# Patient Record
Sex: FEMALE | Race: BLACK OR AFRICAN AMERICAN | NOT HISPANIC OR LATINO
[De-identification: names, ages, dates, MRNs, and addresses within clinical notes are randomized per-mention and may not be internally consistent; named-entity substitution may affect disease eponyms.]

---

## 2019-05-07 PROBLEM — Z00.00 ENCOUNTER FOR PREVENTIVE HEALTH EXAMINATION: Status: ACTIVE | Noted: 2019-05-07

## 2019-05-14 ENCOUNTER — APPOINTMENT (OUTPATIENT)
Dept: ORTHOPEDIC SURGERY | Facility: CLINIC | Age: 59
End: 2019-05-14
Payer: COMMERCIAL

## 2019-05-14 PROCEDURE — 99213 OFFICE O/P EST LOW 20 MIN: CPT

## 2019-05-14 PROCEDURE — 73562 X-RAY EXAM OF KNEE 3: CPT | Mod: LT

## 2019-05-14 NOTE — PHYSICAL EXAM
[de-identified] : X-ray left knee. There is moderate medial compartment arthritis mild lateral and severe patellofemoral arthritis [de-identified] : Constitutional: General Appearance: healthy-appearing, NAD, and normal body habitus.  \par \par \par Gait and Station: Appearance: normal gait, no limp, and ambulates with no assitive devices.  \par \par \par Knees: Inspection Right: no deformity, mass, induration, erythema, warmth, or swelling/effusion. Inspection Left: no deformity, mass, induration, erythema, warmth, or swelling/effusion. Bony Palpation Right: no tenderness of the inferior pole patella, the superior pole patella, the tibial tubercle, the medial femoral condyle, the adductor tubercle, the medial joint line, the lateral joint line, the medial tibial plateau, the lateral tibial plateau, the lateral femoral condyle, Gerdy's tubercle, the head of fibula, or the neck of fibula and tenderness of the lateral patellar facet and the medial patellar facet. Bony Palpation Left: no tenderness of the lateral patellar facet, the inferior pole patella, the superior pole patella, the tibial tubercle, the medial femoral condyle, the adductor tubercle, the medial joint line, the lateral joint line, the medial tibial plateau, the lateral tibial plateau, the lateral femoral condyle, Gerdy's tubercle, the head of fibula, or the neck of fibula and tenderness of the medial patellar facet. Soft Tissue Palpation Right: no tenderness of the quadriceps tendon, the patellar tendon, the lateral collateral ligament, the prepatellar bursa, the medial collateral ligament, the pes anserinus, the iliotibial tract, the popliteal fossa, the biceps femoris tendon, or the gastrocnemius and tenderness of the lateral patellar retinaculum and the medial patellar retinaculum. Soft Tissue Palpation Left: no tenderness of the quadriceps tendon, the patellar tendon, the lateral patellar retinaculum, the prepatellar bursa, the medial collateral ligament, the lateral collateral ligament, the pes anserinus, the iliotibial tract, the popliteal fossa, the biceps femoris tendon, or the gastrocnemius and tenderness of the medial patellar retinaculum. Active Range of Motion Right: crepitus and flexion (125 deg) and extension normal and no pain with motion. Active Range of Motion Left: crepitus and flexion (125 deg.) and extension normal and no pain with motion. Stability Right: no varus or vagus instability or patellar laxity and anterior drawer sign negative, posterior drawer sign negative, and Lachman test negative. Stability Left: no varus or valgus instability or patellar laxity and anterior drawer sign negative, posterior drawer sign negative, and Lachman test negative. Special Tests Right: Apley's compression test negative and Vanessa's displacement test negative. Special Tests Left: Apley's compression test negative and Vanessa's displacement test negative. Strength Right: flexion 5/5 and extension 5/5. Strength Left: flexion 5/5 and extension 5/5.  \par \par \par Skin: Right Lower Extremity: normal. Left Lower Extremity: normal.  \par \par \par Cardiovascular System: Varicosities Right: capillary refill test normal. Varicosities Left: capillary refill test normal.  \par \par \par Neurologic: Sensation on the Right: normal sensation. Sensation on the Left: normal sensation.  \par \par \par Psychiatric: Mood and Affect: normal mood and affect and active and alert.  \par \par \par 4/5 bilateral hip flexion

## 2019-05-14 NOTE — ASSESSMENT
[FreeTextEntry1] : Discussed at length the patient underlying arthritis and treatment modalities. Patient would like to have gel injection ordered. Modest ordered and patient follow up once available.

## 2019-05-14 NOTE — HISTORY OF PRESENT ILLNESS
[de-identified] : Patient is an established patient last seen 2 years ago with bilateral knee arthritis. She states she's had injections in the past which have helped alleviate her symptoms. Currently states her left knee is sore in her right knee is doing well. Patient states she would like to have Visco supplementation ordered

## 2020-01-07 ENCOUNTER — APPOINTMENT (OUTPATIENT)
Dept: ORTHOPEDIC SURGERY | Facility: CLINIC | Age: 60
End: 2020-01-07
Payer: COMMERCIAL

## 2020-01-07 PROCEDURE — 99213 OFFICE O/P EST LOW 20 MIN: CPT

## 2020-01-08 ENCOUNTER — APPOINTMENT (OUTPATIENT)
Dept: ORTHOPEDIC SURGERY | Facility: CLINIC | Age: 60
End: 2020-01-08

## 2020-01-08 NOTE — HISTORY OF PRESENT ILLNESS
[de-identified] : Patient is an established patient last seen in May 2019 with bilateral knee arthritis. She states she's had injections in the past which have helped alleviate her symptoms. Currently states her left knee is sore in her right knee is doing okay but still with soreness. Patient states she would like to have Visco supplementation ordered

## 2020-01-08 NOTE — ASSESSMENT
[FreeTextEntry1] : Viscous supplementation to be ordered.  Patient followup once injections available

## 2020-01-08 NOTE — PHYSICAL EXAM
[de-identified] : Constitutional: General Appearance: healthy-appearing, NAD, and normal body habitus.  \par \par \par Gait and Station: Appearance: normal gait, no limp, and ambulates with no assistive devices.  \par \par \par Knees: Inspection Right: no deformity, mass, induration, erythema, warmth, or swelling/effusion. Inspection Left: no deformity, mass, induration, erythema, warmth, or swelling/effusion. Bony Palpation Right: no tenderness of the inferior pole patella, the superior pole patella, the tibial tubercle, the medial femoral condyle, the adductor tubercle, the medial joint line, the lateral joint line, the medial tibial plateau, the lateral tibial plateau, the lateral femoral condyle, Gerdy's tubercle, the head of fibula, or the neck of fibula and tenderness of the lateral patellar facet and the medial patellar facet. Bony Palpation Left: no tenderness of the lateral patellar facet, the inferior pole patella, the superior pole patella, the tibial tubercle, the medial femoral condyle, the adductor tubercle, the medial joint line, the lateral joint line, the medial tibial plateau, the lateral tibial plateau, the lateral femoral condyle, Gerdy's tubercle, the head of fibula, or the neck of fibula and tenderness of the medial patellar facet. Soft Tissue Palpation Right: no tenderness of the quadriceps tendon, the patellar tendon, the lateral collateral ligament, the prepatellar bursa, the medial collateral ligament, the pes anserinus, the iliotibial tract, the popliteal fossa, the biceps femoris tendon, or the gastrocnemius and tenderness of the lateral patellar retinaculum and the medial patellar retinaculum. Soft Tissue Palpation Left: no tenderness of the quadriceps tendon, the patellar tendon, the lateral patellar retinaculum, the prepatellar bursa, the medial collateral ligament, the lateral collateral ligament, the pes anserinus, the iliotibial tract, the popliteal fossa, the biceps femoris tendon, or the gastrocnemius and tenderness of the medial patellar retinaculum. Active Range of Motion Right: crepitus and flexion (125 deg) and extension normal and no pain with motion. Active Range of Motion Left: crepitus and flexion (125 deg.) and extension normal and no pain with motion. Stability Right: no varus or vagus instability or patellar laxity and anterior drawer sign negative, posterior drawer sign negative, and Lachman test negative. Stability Left: no varus or valgus instability or patellar laxity and anterior drawer sign negative, posterior drawer sign negative, and Lachman test negative. Special Tests Right: Apley's compression test negative and Vanessa's displacement test negative. Special Tests Left: Apley's compression test negative and Vanessa's displacement test negative. Strength Right: flexion 5/5 and extension 5/5. Strength Left: flexion 5/5 and extension 5/5.  \par \par \par Skin: Right Lower Extremity: normal. Left Lower Extremity: normal.  \par \par \par Cardiovascular System: Varicosities Right: capillary refill test normal. Varicosities Left: capillary refill test normal.  \par \par \par Neurologic: Sensation on the Right: normal sensation. Sensation on the Left: normal sensation.  \par \par \par Psychiatric: Mood and Affect: normal mood and affect and active and alert.  \par \par \par 4/5 bilateral hip flexion

## 2020-03-17 RX ORDER — HYALURONATE SODIUM 30 MG/2 ML
30 SYRINGE (ML) INTRAARTICULAR
Qty: 6 | Refills: 0 | Status: ACTIVE | OUTPATIENT
Start: 2020-03-17

## 2020-06-09 ENCOUNTER — TRANSCRIPTION ENCOUNTER (OUTPATIENT)
Age: 60
End: 2020-06-09

## 2020-11-09 ENCOUNTER — APPOINTMENT (OUTPATIENT)
Dept: ORTHOPEDIC SURGERY | Facility: CLINIC | Age: 60
End: 2020-11-09
Payer: SELF-PAY

## 2020-11-09 DIAGNOSIS — M25.571 PAIN IN RIGHT ANKLE AND JOINTS OF RIGHT FOOT: ICD-10-CM

## 2020-11-09 PROCEDURE — 73650 X-RAY EXAM OF HEEL: CPT | Mod: RT

## 2020-11-09 PROCEDURE — 99213 OFFICE O/P EST LOW 20 MIN: CPT

## 2020-11-09 NOTE — PHYSICAL EXAM
[Normal RLE] : Right Lower Extremity: No scars, rashes, lesions, ulcers, skin intact [Normal Touch] : touch [Normal] : Alert and in no acute distress [de-identified] : Right foot and ankle shows no warmth or swelling. His tenderness at insertion of the Achilles tendon. There is negative Torres test. Full range of motion. Foot is mildly pronated. [de-identified] : Right foot x-ray shows calcification at the insertion of the Achilles.

## 2020-11-09 NOTE — DISCUSSION/SUMMARY
[Medication Risks Reviewed] : Medication risks reviewed [de-identified] : Ice, heat, anti-inflammatory medicine over-the-counter, prescription anti-inflammatory, physical therapy, boot all discussed. She will do the home things are prescribed anti-inflammatories it's not better we will try a boot. Follow up as needed.

## 2020-11-09 NOTE — HISTORY OF PRESENT ILLNESS
[FreeTextEntry1] : Location: right calcaneus \par Quality:  aching\par Duration: 11/7/2020\par Context: atraumatic\par Aggravating Factors: getting out of bed in the morning, standing , walking  \par Conservative treatment:  OTC medication , elevation , stretching\par Associated Symptoms: swelling\par Prior Studies: n.a\par

## 2021-04-23 ENCOUNTER — RX RENEWAL (OUTPATIENT)
Age: 61
End: 2021-04-23

## 2022-01-07 ENCOUNTER — RX RENEWAL (OUTPATIENT)
Age: 62
End: 2022-01-07

## 2022-07-14 ENCOUNTER — APPOINTMENT (OUTPATIENT)
Dept: ORTHOPEDIC SURGERY | Facility: CLINIC | Age: 62
End: 2022-07-14

## 2022-07-14 DIAGNOSIS — M25.561 PAIN IN RIGHT KNEE: ICD-10-CM

## 2022-07-14 DIAGNOSIS — M25.562 PAIN IN RIGHT KNEE: ICD-10-CM

## 2022-07-14 PROCEDURE — 73562 X-RAY EXAM OF KNEE 3: CPT | Mod: 50

## 2022-07-14 PROCEDURE — 99214 OFFICE O/P EST MOD 30 MIN: CPT

## 2022-07-14 NOTE — PHYSICAL EXAM
[de-identified] : Bilateral knee\par \par Constitutional: \par The patient is healthy-appearing and in no apparent distress. \par \par Gait:\par The patient ambulates with a normal gait and no limp.\par \par Cardiovascular System: \par The capillary refill is less than 2 seconds. \par \par Skin: \par There are no skin abnormalities.\par \par Right Knee:\par  \par Bony Palpation: \par There is no tenderness of the medial joint line. \par There is no tenderness of the lateral joint line.\par There is mild tenderness of the medial femoral chondyle.\par There is no tenderness of the lateral femoral chondyle.\par There is no tenderness of the tibial tubercle.\par There is no tenderness of the superior patella.\par There is no tenderness of the inferior patella.\par There is mild tenderness of the medial patellar facet.\par There is mild tenderness of the lateral patellar facet.\par \par Soft Tissue Palpation: \par There is mild tenderness of the medial retinaculum.\par There is no tenderness of the lateral retinaculum.\par There is no tenderness of the quadriceps tendon.\par There is no tenderness of the patella tendon.\par There is no tenderness of the ITB.\par There is no tenderness of the pes anserine.\par \par Active Range of Motion: \par The range of motion at the knee actively and passively is full. \par \par Special Tests: \par There is a negative Apley.\par There is a negative Steinmanns. \par There is a negative Lachman and Anterior Drawer.\par There is a negative Posterior Drawer.  \par There is no varus or valgus laxity.\par \par Strength: \par There is 5/5 hip flexion and 5/5 knee flexion and extension.  \par \par Left Knee:\par  \par Bony Palpation: \par There is no tenderness of the medial joint line. \par There is no tenderness of the lateral joint line.\par There is mild tenderness of the medial femoral chondyle.\par There is no tenderness of the lateral femoral chondyle.\par There is no tenderness of the tibial tubercle.\par There is no tenderness of the superior patella.\par There is no tenderness of the inferior patella.\par There is mild tenderness of the medial patellar facet.\par There is mild tenderness of the lateral patellar facet.\par \par Soft Tissue Palpation: \par There is mild tenderness of the medial retinaculum.\par There is no tenderness of the lateral retinaculum.\par There is no tenderness of the quadriceps tendon.\par There is no tenderness of the patella tendon.\par There is no tenderness of the ITB.\par There is no tenderness of the pes anserine.\par \par Active Range of Motion: \par The range of motion at the knee actively and passively is full. \par \par Special Tests: \par There is a negative Apley.\par There is a negative Steinmanns. \par There is a negative Lachman and Anterior Drawer.\par There is a negative Posterior Drawer.  \par There is no varus or valgus laxity.\par \par Strength: \par There is 5/5 hip flexion and 5/5 knee flexion and extension.  \par \par Psychiatric: \par The patient demonstrates a normal mood and affect and is active and alert\par \par  [de-identified] : Given patient's reported history and physical examination, x-ray evaluation ( as listed below ) was ordered and performed to aid in diagnosis and treatment of the patient.\par X-ray bilateral knee.  There is moderate to severe medial compartment arthritis on the right and moderate patellofemoral arthritis bilaterally with moderate medial compartment arthritis on the left

## 2022-07-14 NOTE — HISTORY OF PRESENT ILLNESS
[de-identified] : Patient is an established patient last seen 2-1/2 years ago with bilateral knee arthritis.  She states currently she been doing rather well on her last month has had some mild discomfort in both knees right greater than left.  Denies any recent fall or trauma\par

## 2022-07-14 NOTE — ASSESSMENT
[FreeTextEntry1] : Discussed at length the patient underwent arthritis as well as treatment options and at this time patient elects home exercises and prescription anti-inflammatory.  If no improvement consideration of cortisone injection and possible need for viscous supplementation.  Patient aware that ultimately she may require total knee replacements if pain persists\par

## 2022-12-08 ENCOUNTER — APPOINTMENT (OUTPATIENT)
Dept: PHYSICAL MEDICINE AND REHAB | Facility: CLINIC | Age: 62
End: 2022-12-08

## 2022-12-13 ENCOUNTER — APPOINTMENT (OUTPATIENT)
Dept: PHYSICAL MEDICINE AND REHAB | Facility: CLINIC | Age: 62
End: 2022-12-13

## 2022-12-13 ENCOUNTER — NON-APPOINTMENT (OUTPATIENT)
Age: 62
End: 2022-12-13

## 2022-12-13 DIAGNOSIS — M79.18 MYALGIA, OTHER SITE: ICD-10-CM

## 2022-12-13 PROCEDURE — 20552 NJX 1/MLT TRIGGER POINT 1/2: CPT

## 2022-12-13 PROCEDURE — 99204 OFFICE O/P NEW MOD 45 MIN: CPT | Mod: 25

## 2022-12-13 RX ORDER — MELOXICAM 7.5 MG/1
7.5 TABLET ORAL TWICE DAILY
Qty: 50 | Refills: 0 | Status: ACTIVE | COMMUNITY
Start: 2022-12-13 | End: 1900-01-01

## 2022-12-13 NOTE — ASSESSMENT
[FreeTextEntry1] : Discussed diagnosis and treatment plan including PT.\par Avoid back flexion.  Limit sitting.  Ice back often.  Lift things properly.  Massage back with tennis ball.\par Stop current nsaid and try mobic. \par Get back to Pilates.\par \par f/u 2-3 wk.  Discussed JONAS if not better.

## 2022-12-13 NOTE — PROCEDURE
[de-identified] : Indication: myofascial pain\par \par After informed consent,she elected to proceed with a trigger point injection into the right gluteus ranjeet. I confirmed no prior adverse reactions, no active infections, and no relevant allergies. \par  \par The skin was prepped in the usual sterile manner.  The sites were injected with Lidocaine followed by local needling. The injection was completed without complication and a bandage was applied. She tolerated the procedure well and was given post-injection instructions. \par  \par Cold Tx x 48 hours, analgesics prn. Medications: 0.5 ml of 1% Lidocaine per site\par \par Exp 5/2023\par Manufacture: [employer]\par NDC 3131-2291-65\par [id number]\par

## 2022-12-13 NOTE — PHYSICAL EXAM
[FreeTextEntry1] : PEDRO PABLO is a 62 year old female \par Constitutional: healthy appearing, NAD, and overweight\par \par LUMBAR\par ROM: flexion to 30 deg, ext to 5 deg\par \par Gait: normal\par \par Inspection: no erythema, warmth\par Spine: no TTP in spinous process\par Bony palpation: no TTP in GT\par \par Soft tissue palpation hip: no TTP in gluteus ranjeet\par Soft tissue palpation of spine: no TTP in lumbar paraspinals\par \par 5/5 bilateral KE, DF, PF \par sensation intact in bilat LE\par reflexes: ankle 0 bilat\par \par Special tests: neg seated SLR\par \par

## 2022-12-13 NOTE — HISTORY OF PRESENT ILLNESS
[FreeTextEntry1] : Location: right hip \par Severity: moderate\par Duration: few months\par Context: on long flight to [place]\par Aggravating Factors: sitting\par Alleviating Factors: diclofenac\par Associated Symptoms: denies weight loss, fever, chills, change in bowel/bladder habits, weakness, numbness/tingling, +radiation down right leg\par \par

## 2023-01-09 ENCOUNTER — APPOINTMENT (OUTPATIENT)
Dept: PHYSICAL MEDICINE AND REHAB | Facility: CLINIC | Age: 63
End: 2023-01-09
Payer: COMMERCIAL

## 2023-01-17 ENCOUNTER — APPOINTMENT (OUTPATIENT)
Dept: PHYSICAL MEDICINE AND REHAB | Facility: CLINIC | Age: 63
End: 2023-01-17
Payer: COMMERCIAL

## 2023-01-17 DIAGNOSIS — M54.16 RADICULOPATHY, LUMBAR REGION: ICD-10-CM

## 2023-01-17 PROCEDURE — 99214 OFFICE O/P EST MOD 30 MIN: CPT

## 2023-01-17 RX ORDER — HYALURONATE SODIUM, STABILIZED 88 MG/4 ML
88 SYRINGE (ML) INTRAARTICULAR
Qty: 2 | Refills: 0 | Status: DISCONTINUED | OUTPATIENT
Start: 2020-01-08 | End: 2023-01-17

## 2023-01-17 RX ORDER — HYALURONATE SODIUM 30 MG/2 ML
30 SYRINGE (ML) INTRAARTICULAR
Qty: 3 | Refills: 0 | Status: DISCONTINUED | OUTPATIENT
Start: 2020-03-16 | End: 2023-01-17

## 2023-01-17 RX ORDER — HYALURONATE SODIUM, STABILIZED 88 MG/4 ML
88 SYRINGE (ML) INTRAARTICULAR
Qty: 2 | Refills: 0 | Status: DISCONTINUED | OUTPATIENT
Start: 2020-01-07 | End: 2023-01-17

## 2023-01-17 RX ORDER — HYALURONATE SODIUM, STABILIZED 88 MG/4 ML
88 SYRINGE (ML) INTRAARTICULAR
Qty: 1 | Refills: 0 | Status: DISCONTINUED | COMMUNITY
Start: 2019-05-14 | End: 2023-01-17

## 2023-01-17 NOTE — HISTORY OF PRESENT ILLNESS
[FreeTextEntry1] : Location: right hip \par Severity: mild, improving\par Duration: few months\par Context: on long flight to [place]\par Aggravating Factors: sitting\par Alleviating Factors: diclofenac\par Associated Symptoms: denies weight loss, fever, chills, change in bowel/bladder habits, weakness, numbness/tingling, +radiation down right leg\par \par

## 2023-01-17 NOTE — PHYSICAL EXAM
[FreeTextEntry1] : PEDRO PABLO is a 62 year old female \par Constitutional: healthy appearing, NAD, and overweight\par \par LUMBAR\par ROM: flexion to 30 deg, ext to 5 deg\par \par Gait: normal\par \par Inspection: no erythema, warmth\par Spine: no TTP in spinous process\par Bony palpation: no TTP in GT\par \par Soft tissue palpation hip: no TTP in gluteus ranejet\par Soft tissue palpation of spine: no TTP in lumbar paraspinals\par \par 5/5 bilateral KE, DF, PF \par sensation intact in bilat LE\par pop angle 80 deg

## 2023-03-29 ENCOUNTER — APPOINTMENT (OUTPATIENT)
Dept: ORTHOPEDIC SURGERY | Facility: CLINIC | Age: 63
End: 2023-03-29
Payer: COMMERCIAL

## 2023-03-29 VITALS — HEIGHT: 71 IN | WEIGHT: 227 LBS | BODY MASS INDEX: 31.78 KG/M2

## 2023-03-29 DIAGNOSIS — M89.8X1 OTHER SPECIFIED DISORDERS OF BONE, SHOULDER: ICD-10-CM

## 2023-03-29 PROCEDURE — 99213 OFFICE O/P EST LOW 20 MIN: CPT

## 2023-03-29 PROCEDURE — 73030 X-RAY EXAM OF SHOULDER: CPT | Mod: RT

## 2023-03-29 RX ORDER — DICLOFENAC SODIUM 100 MG/1
100 TABLET, FILM COATED, EXTENDED RELEASE ORAL
Qty: 30 | Refills: 3 | Status: ACTIVE | COMMUNITY
Start: 2021-06-14 | End: 1900-01-01

## 2023-03-29 NOTE — PHYSICAL EXAM
[de-identified] : Left Shoulder:\par Constitutional:\par The patient is healthy-appearing and in no apparent distress. \par \par Cardiovascular System: \par The capillary refill is less than 2 seconds. \par \par Skin: \par There are no skin abnormalities.\par \par C-Spine/Neck:\par \par Active Range of Motion:\par Flexion				50\par Extension			60\par Lateral rotation			80  \par \par Left Shoulder: \par Inspection: \par There is no atrophy, erythema, warmth, swelling.\par There is no scapular winging.\par There is no AC prominence. \par \par Bony Palpation: \par There is no tenderness of the clavicle.\par There is no tenderness of the acromioclavicular joint.\par There is no tenderness of the greater tuberosity. \par There is no tenderness of the bicipital groove.\par  \par Soft Tissue Palpation: \par There is tenderness of the trapezius.\par There is tenderness of the rhomboid.\par There is no tenderness of the subacromial bursa. \par \par Active Range of Motion: \par Forward flexion- 				165 \par Abduction-					150\par External rotation at 0 degrees abduction-	80 \par Internal rotation at 0 degrees abduction-	80\par \par Passive Range of Motion: \par Forward flexion- 			180 \par Abduction-				150\par External rotation at 0 deg abduction-	80 \par Internal rotation at 0 deg abduction-	80\par \par Special Tests: \par Hawkin's  				Negative \par Neer's  				Negative\par Speed's  				Negative\par AC cross-over 			            Negative\par Faulkner's  				Negative\par \par Stability: \par There is no general laxity. \par \par Psychiatric: \par The patient demonstrates a normal mood and affect and is active and alert [de-identified] : Given patient's reported history and physical examination, x-ray evaluation ( as listed below ) was ordered and performed to aid in diagnosis and treatment of the patient.\par X-ray left shoulder.  There is no significant bony / soft tissue abnormality, arthritis, or fracture.\par \par

## 2023-03-29 NOTE — HISTORY OF PRESENT ILLNESS
[de-identified] : Follow up on LEFT [place]\par Last Visit: 07/14/2022- B/L KNee- Cortisone Injection\par Pain Level: 1/10 Left Shoulder\par Reason: Gymnastics Injury in High School \par Aggrvt\par Allvt: Anti Inflammatories\par Pain Med: Tylenol \par Prior Studies: Chiropractor\par Hx of Arthritis (as per patient)\par NKDA

## 2023-03-29 NOTE — ASSESSMENT
[FreeTextEntry1] : Discussed at length the patient exam history and imaging.  Patient states significantly for diclofenac this time we will have a new prescription sent as well as home exercises.  Offered physical therapy but declines

## 2023-03-29 NOTE — REASON FOR VISIT
[Initial Visit] : an initial visit for [Shoulder Pain] : shoulder pain [FreeTextEntry2] : LEFT Shoulder

## 2023-05-24 ENCOUNTER — APPOINTMENT (OUTPATIENT)
Dept: ORTHOPEDIC SURGERY | Facility: CLINIC | Age: 63
End: 2023-05-24
Payer: COMMERCIAL

## 2023-05-24 PROCEDURE — 99213 OFFICE O/P EST LOW 20 MIN: CPT | Mod: 25

## 2023-05-24 PROCEDURE — 20610 DRAIN/INJ JOINT/BURSA W/O US: CPT

## 2023-05-24 NOTE — PROCEDURE
Patient here in antico clinic. Reviewed past INR and dose with patient. Patient denies missed doses of Warfarin or medication changes. Patient returned from [place] about 1 week ago. Patient had INR monitored there, last INR was 2.9. Diet and activity are consistent. Reviewed INR goal. INR therapeutic. Will continue same dose of Warfarin and recheck INR in 4 weeks. Reminded patient to call office with any changes. INR and dose per Dr. ALISA Griffin's protocol. Dr ALISA Griffin onsite provider.   [de-identified] : Patient has demonstrated limited relief from NSAIDS, rest, exercises / PT, and after discussion of the risks and benefits, the patient has elected to proceed with a corticosteroid injection into the RIGHT knee via an Anterolateral site.\par Confirmed that the patient does not have history of prior adverse reactions, active, infections, or relevant allergies.   There was no erythema or warmth, and the skin was clear.  The skin was sterilized with alcohol and via sterile technique, the knee was injected 3 cc of 1% xylocaine with 40 mg Kenalog.  The injection was completed without complication and a bandage was applied.  The patient tolerated the procedure well and was given post-injection instructions. No

## 2023-05-24 NOTE — HISTORY OF PRESENT ILLNESS
[de-identified] : Patient is an established patient presenting for 1 to 2 weeks of right knee discomfort.  She has a history of right knee arthritis

## 2023-05-24 NOTE — PHYSICAL EXAM
[de-identified] : Right knee\par \par Constitutional: \par The patient is healthy-appearing and in no apparent distress. \par \par Gait:\par The patient ambulates with a normal gait and no limp.\par \par Cardiovascular System: \par The capillary refill is less than 2 seconds. \par \par Skin: \par There are no skin abnormalities.\par \par Right Knee: \par \par Bony Palpation: \par There is no tenderness of the medial joint line. \par There is no tenderness of the lateral joint line.\par There is no tenderness of the medial femoral chondyle.\par There is no tenderness of the lateral femoral chondyle.\par There is no tenderness of the tibial tubercle.\par There is no tenderness of the superior patella.\par There is no tenderness of the inferior patella.\par There is tenderness of the medial patellar facet.\par There is tenderness of the lateral patellar facet.\par \par Soft Tissue Palpation: \par There is no tenderness of the medial retinaculum.\par There is no tenderness of the lateral retinaculum.\par There is no tenderness of the quadriceps tendon.\par There is no tenderness of the patella tendon.\par There is no tenderness of the ITB.\par There is no tenderness of the pes anserine.\par \par Active Range of Motion: \par The range of motion at the knee actively and passively is full. \par \par Special Tests: \par There is a negative Apley.\par There is a negative Steinmanns. \par There is a negative Lachman and Anterior Drawer.\par There is a negative Posterior Drawer.  \par There is no varus or valgus laxity.\par \par Strength: \par There is 4/5 hip flexion and 5/5 knee flexion and extension.  \par \par Psychiatric: \par The patient demonstrates a normal mood and affect and is active and alert.\par \par Alignment:\par There is external tibial rotation and femoral anteversion.

## 2023-05-24 NOTE — ASSESSMENT
[FreeTextEntry1] : Discussed at length with patient exam history and prior imaging and treatment options at this time she elects cortisone injections and observation

## 2023-05-30 RX ORDER — MELOXICAM 15 MG/1
15 TABLET ORAL
Qty: 30 | Refills: 2 | Status: ACTIVE | COMMUNITY
Start: 2023-05-30 | End: 1900-01-01

## 2023-06-12 ENCOUNTER — RX RENEWAL (OUTPATIENT)
Age: 63
End: 2023-06-12

## 2023-06-12 RX ORDER — NABUMETONE 500 MG/1
500 TABLET, FILM COATED ORAL
Qty: 60 | Refills: 0 | Status: ACTIVE | COMMUNITY
Start: 2022-07-14 | End: 1900-01-01

## 2023-06-13 ENCOUNTER — RX RENEWAL (OUTPATIENT)
Age: 63
End: 2023-06-13

## 2023-06-28 ENCOUNTER — APPOINTMENT (OUTPATIENT)
Dept: ORTHOPEDIC SURGERY | Facility: CLINIC | Age: 63
End: 2023-06-28

## 2023-07-18 ENCOUNTER — APPOINTMENT (OUTPATIENT)
Dept: ORTHOPEDIC SURGERY | Facility: CLINIC | Age: 63
End: 2023-07-18
Payer: COMMERCIAL

## 2023-07-18 VITALS — WEIGHT: 225 LBS | HEIGHT: 70.5 IN | BODY MASS INDEX: 31.85 KG/M2

## 2023-07-18 PROCEDURE — 73502 X-RAY EXAM HIP UNI 2-3 VIEWS: CPT | Mod: RT

## 2023-07-18 PROCEDURE — 99213 OFFICE O/P EST LOW 20 MIN: CPT | Mod: 25

## 2023-07-18 PROCEDURE — 73562 X-RAY EXAM OF KNEE 3: CPT | Mod: RT

## 2023-07-18 PROCEDURE — 20610 DRAIN/INJ JOINT/BURSA W/O US: CPT | Mod: RT

## 2023-07-19 NOTE — REVIEW OF SYSTEMS
Medication/Dose: kelnor 1/35 1-35mg-mcg  Patient last seen by PCP:  3/9/2020  Next office visit with PCP:  none  Last Lab: 3/9/2020    Above information requires contacting patient: No    Prescription does not require PDMP check    Sent to provider to approve or deny       [Negative] : Heme/Lymph

## 2023-07-20 NOTE — PROCEDURE
[de-identified] : Patient has demonstrated limited relief from NSAIDS, rest, exercises / PT, and after discussion of the risks and benefits, the patient has elected to proceed with a corticosteroid injection into the RIGHT knee via an Anterolateral site.\par Confirmed that the patient does not have history of prior adverse reactions, active, infections, or relevant allergies.   There was no erythema or warmth, and the skin was clear.  The skin was sterilized with alcohol and via sterile technique, the knee was injected 3 cc of 1% xylocaine with 40 mg Kenalog.  The injection was completed without complication and a bandage was applied.  The patient tolerated the procedure well and was given post-injection instructions.

## 2023-07-20 NOTE — PHYSICAL EXAM
[de-identified] : Right knee\par \par Constitutional: \par The patient is healthy-appearing and in no apparent distress. \par \par Gait:\par The patient ambulates with a normal gait and no limp.\par \par Cardiovascular System: \par The capillary refill is less than 2 seconds. \par \par Skin: \par There are no skin abnormalities.\par \par Right Knee: \par \par Bony Palpation: \par There is no tenderness of the medial joint line. \par There is no tenderness of the lateral joint line.\par There is no tenderness of the medial femoral chondyle.\par There is no tenderness of the lateral femoral chondyle.\par There is no tenderness of the tibial tubercle.\par There is no tenderness of the superior patella.\par There is no tenderness of the inferior patella.\par There is tenderness of the medial patellar facet.\par There is tenderness of the lateral patellar facet.\par \par Soft Tissue Palpation: \par There is no tenderness of the medial retinaculum.\par There is no tenderness of the lateral retinaculum.\par There is no tenderness of the quadriceps tendon.\par There is no tenderness of the patella tendon.\par There is no tenderness of the ITB.\par There is no tenderness of the pes anserine.\par \par Active Range of Motion: \par The range of motion at the knee actively and passively is full. \par \par Special Tests: \par There is a negative Apley.\par There is a negative Steinmanns. \par There is a negative Lachman and Anterior Drawer.\par There is a negative Posterior Drawer.  \par There is no varus or valgus laxity.\par \par Strength: \par There is 4/5 hip flexion and 5/5 knee flexion and extension.  \par \par Psychiatric: \par The patient demonstrates a normal mood and affect and is active and alert.\par \par Right hip \par \par Gait:\par The patient ambulates with a normal gait and no limp.\par \par Cardiovascular System: \par There is capillary refill less than 2 seconds. \par \par Skin: \par There is no skin abnormalities.\par \par Lumbar Spine;\par There is no significance malalignment and no tenderness to the paraspinal musculature.\par \par Right hip:\par \par Bony Palpation: \par There is no tenderness of the iliac crest.\par There is no tenderness of the ASIS.\par There is no tenderness of the PSIS.\par There is no tenderness of the SI joint.\par There is tenderness of the greater trochanter. \par \par Soft Tissue Palpation: \par There is no tenderness of the hip adductors.\par There is no tenderness of the hip abductors.\par There is no tenderness of the hamstrings. \par There is no tenderness of the piriformis. \par There is no tenderness of the hip flexors.\par \par Active Range of Motion: \par There is full range of motion at the hip actively and passively. \par \par Special Tests: \par There is a positive Favian's test.\par There is a negative Alexander-Neftali test. \par \par Strength: \par There is 5/5 hip flexion, adduction, abduction.  \par \par  [de-identified] : X-ray right hip there is no significant bony / soft tissue abnormality, arthritis, or fracture.  X-ray right knee there is severe medial and patellofemoral arthritis

## 2023-07-20 NOTE — HISTORY OF PRESENT ILLNESS
[de-identified] : Follow up on RIGHT Knee\par Last Visit: 05/24/2023 - Corticosteroid Injection Administered - IMprovement \par Pain Level: 6/10 - Inconsistent \par Symptoms: Minor Swelling / Shooting Pain to RIGHT Calf

## 2023-09-11 ENCOUNTER — APPOINTMENT (OUTPATIENT)
Dept: ORTHOPEDIC SURGERY | Facility: CLINIC | Age: 63
End: 2023-09-11
Payer: COMMERCIAL

## 2023-09-11 PROCEDURE — 99213 OFFICE O/P EST LOW 20 MIN: CPT

## 2023-09-11 PROCEDURE — 72100 X-RAY EXAM L-S SPINE 2/3 VWS: CPT

## 2023-09-11 PROCEDURE — 73502 X-RAY EXAM HIP UNI 2-3 VIEWS: CPT

## 2023-09-11 RX ORDER — NABUMETONE 500 MG/1
500 TABLET, FILM COATED ORAL
Qty: 60 | Refills: 2 | Status: ACTIVE | COMMUNITY
Start: 2023-09-11 | End: 1900-01-01

## 2023-09-25 ENCOUNTER — APPOINTMENT (OUTPATIENT)
Dept: ORTHOPEDIC SURGERY | Facility: CLINIC | Age: 63
End: 2023-09-25
Payer: COMMERCIAL

## 2023-09-25 PROCEDURE — 99213 OFFICE O/P EST LOW 20 MIN: CPT | Mod: 25

## 2023-09-25 PROCEDURE — 20610 DRAIN/INJ JOINT/BURSA W/O US: CPT | Mod: 50

## 2023-11-14 ENCOUNTER — APPOINTMENT (OUTPATIENT)
Dept: ORTHOPEDIC SURGERY | Facility: CLINIC | Age: 63
End: 2023-11-14
Payer: COMMERCIAL

## 2023-11-14 VITALS — BODY MASS INDEX: 31.85 KG/M2 | HEIGHT: 70.5 IN | WEIGHT: 225 LBS

## 2023-11-14 DIAGNOSIS — M48.061 SPINAL STENOSIS, LUMBAR REGION WITHOUT NEUROGENIC CLAUDICATION: ICD-10-CM

## 2023-11-14 DIAGNOSIS — M16.11 UNILATERAL PRIMARY OSTEOARTHRITIS, RIGHT HIP: ICD-10-CM

## 2023-11-14 DIAGNOSIS — M51.26 OTHER INTERVERTEBRAL DISC DISPLACEMENT, LUMBAR REGION: ICD-10-CM

## 2023-11-14 PROCEDURE — 99213 OFFICE O/P EST LOW 20 MIN: CPT

## 2023-11-27 ENCOUNTER — APPOINTMENT (OUTPATIENT)
Dept: ORTHOPEDIC SURGERY | Facility: CLINIC | Age: 63
End: 2023-11-27
Payer: COMMERCIAL

## 2023-11-27 VITALS — HEIGHT: 70.5 IN | WEIGHT: 225 LBS | BODY MASS INDEX: 31.85 KG/M2

## 2023-11-27 PROCEDURE — 99213 OFFICE O/P EST LOW 20 MIN: CPT | Mod: 25

## 2023-11-27 PROCEDURE — 20610 DRAIN/INJ JOINT/BURSA W/O US: CPT | Mod: 50

## 2024-01-04 RX ORDER — DICLOFENAC SODIUM 100 MG/1
100 TABLET, FILM COATED, EXTENDED RELEASE ORAL
Qty: 30 | Refills: 2 | Status: ACTIVE | COMMUNITY
Start: 2024-01-04 | End: 1900-01-01

## 2024-04-30 ENCOUNTER — APPOINTMENT (OUTPATIENT)
Dept: ORTHOPEDIC SURGERY | Facility: CLINIC | Age: 64
End: 2024-04-30
Payer: MEDICARE

## 2024-04-30 DIAGNOSIS — M17.10 UNILATERAL PRIMARY OSTEOARTHRITIS, UNSPECIFIED KNEE: ICD-10-CM

## 2024-04-30 PROCEDURE — 20610 DRAIN/INJ JOINT/BURSA W/O US: CPT | Mod: 50

## 2024-04-30 PROCEDURE — 73562 X-RAY EXAM OF KNEE 3: CPT | Mod: 50

## 2024-04-30 PROCEDURE — G2211 COMPLEX E/M VISIT ADD ON: CPT

## 2024-04-30 PROCEDURE — 99213 OFFICE O/P EST LOW 20 MIN: CPT | Mod: 25

## 2024-04-30 RX ORDER — DICLOFENAC SODIUM 100 MG/1
100 TABLET, FILM COATED, EXTENDED RELEASE ORAL
Qty: 30 | Refills: 2 | Status: ACTIVE | COMMUNITY
Start: 2020-11-09 | End: 1900-01-01

## 2024-04-30 NOTE — PHYSICAL EXAM
[de-identified] : Bilateral knee  Constitutional:  The patient is healthy-appearing and in no apparent distress.   Gait: The patient ambulates with a normal gait and no limp.  Cardiovascular System:  The capillary refill is less than 2 seconds.   Skin:  There are no skin abnormalities.  Right Knee:   Bony Palpation:  There is tenderness of the medial joint line.  There is no tenderness of the lateral joint line. There is tenderness of the medial femoral chondyle. There is no tenderness of the lateral femoral chondyle. There is no tenderness of the tibial tubercle. There is no tenderness of the superior patella. There is no tenderness of the inferior patella. There is tenderness of the medial patellar facet. There is tenderness of the lateral patellar facet.  Soft Tissue Palpation:  There is no tenderness of the medial retinaculum. There is no tenderness of the lateral retinaculum. There is no tenderness of the quadriceps tendon. There is no tenderness of the patella tendon. There is no tenderness of the ITB. There is no tenderness of the pes anserine.  Active Range of Motion:  The range of motion at the knee actively and passively is full.   Special Tests:  There is a negative Apley. There is a negative Steinmanns.  There is a negative Lachman and Anterior Drawer. There is a negative Posterior Drawer.   There is no varus or valgus laxity.  Strength:  There is 5/5 hip flexion and 5/5 knee flexion and extension.    Left Knee:   Bony Palpation:  There is tenderness of the medial joint line.  There is no tenderness of the lateral joint line. There is tenderness of the medial femoral chondyle. There is no tenderness of the lateral femoral chondyle. There is no tenderness of the tibial tubercle. There is no tenderness of the superior patella. There is no tenderness of the inferior patella. There is tenderness of the medial patellar facet. There is tenderness of the lateral patellar facet.  Soft Tissue Palpation:  There is no tenderness of the medial retinaculum. There is no tenderness of the lateral retinaculum. There is no tenderness of the quadriceps tendon. There is no tenderness of the patella tendon. There is no tenderness of the ITB. There is no tenderness of the pes anserine.  Active Range of Motion:  The range of motion at the knee actively and passively is full.   Special Tests:  There is a negative Apley. There is a negative Steinmanns.  There is a negative Lachman and Anterior Drawer. There is a negative Posterior Drawer.   There is no varus or valgus laxity.  Strength:  There is 5/5 hip flexion and 5/5 knee flexion and extension.    Psychiatric:  The patient demonstrates a normal mood and affect and is active and alert  [de-identified] : X-ray bilateral knee: There is severe medial and patellofemoral arthritis

## 2024-04-30 NOTE — HISTORY OF PRESENT ILLNESS
[de-identified] : Last Visit:11/27/2023 Reason: Right knee pain  -  Pain: 5/10 both knees  Symptoms: sharp  Prior studies: new x rays ordered Physical therapy:

## 2024-04-30 NOTE — ASSESSMENT
[FreeTextEntry1] : Discussed at length with patient underlying arthritis and treatment options and at this time patient elects observation with cortisone injection as well as anti-inflammatory

## 2024-04-30 NOTE — PROCEDURE

## 2024-05-23 ENCOUNTER — APPOINTMENT (OUTPATIENT)
Dept: ORTHOPEDIC SURGERY | Facility: CLINIC | Age: 64
End: 2024-05-23
Payer: MEDICARE

## 2024-05-23 DIAGNOSIS — M54.2 CERVICALGIA: ICD-10-CM

## 2024-05-23 DIAGNOSIS — G89.29 CERVICALGIA: ICD-10-CM

## 2024-05-23 DIAGNOSIS — R82.994 HYPERCALCIURIA: ICD-10-CM

## 2024-05-23 PROCEDURE — 72050 X-RAY EXAM NECK SPINE 4/5VWS: CPT

## 2024-05-23 PROCEDURE — 99214 OFFICE O/P EST MOD 30 MIN: CPT

## 2024-05-30 NOTE — PHYSICAL EXAM
[de-identified] :  General: No acute distress, conversant, well-nourished. Head: Normocephalic, atraumatic Neck: trachea midline, FROM Heart: normotensive and normal rate and rhythm Lungs: No labored breathing Skin: No abrasions, no rashes, no edema Psych: Alert and oriented to person, place and time Extremities: no peripheral edema or digital cyanosis Gait: Normal gait. Can perform tandem gait.  Vascular: warm and well perfused distally, palpable distal pulses   MSK: Cervical Spine: Alignment normal. No tenderness to palpation.  No step-off, no deformity. No pain or neurologic symptoms with full active range of motion (flexion, extension, lateral bending and rotation).   NEURO: Sensation          Left          C5     2/2              C6     2/2              C7     2/2              C8     2/2             T1     2/2                       Right        C5     2/2              C6     2/2              C7     2/2              C8     2/2             T1     2/2       Motor:                                                Left            C5 (deltoid abduction)             5/5              C6 (biceps flexion)                   5/5               C7 (triceps extension)             5/5              C8 (finger flexion)                     5/5              T1 (interosseous)                     5/5                                                           Right          C5 (deltoid abduction)             5/5              C6 (biceps flexion)                   5/5               C7 (triceps extension)             5/5              C8 (finger flexion)                     5/5              T1 (interosseous)                     5/5                      Reflexes: Normal and symmetric   Negative Spurlings test.  Negative Bakodys sign. Negative Hoffmans reflex.  [de-identified] : I ordered radiographs to evaluate the patient's symptoms. Cervical 4 view radiographs taken in the office today show no dislocation or fracture. Cervical spondylosis.  No instability on dynamic series.

## 2024-05-30 NOTE — ASSESSMENT
[FreeTextEntry1] : 65 y/o female presenting with chronic neck pain worsening over the past couple of months. The pain radiates into her left scapula. She has been taking diclofenac with relief. Massage and acupuncture help.  denies recent illness, fevers, numbness, weakness, balance problems, saddle anesthesia, urinary retention or fecal incontinence. The patient was given a referral for physical therapy. Continue diclofenac. F/U in 4-6 weeks. We discussed red flag symptoms that would require emergent evaluation. She knows to call with any questions or concerns or if her symptoms acutely worsen.

## 2024-05-30 NOTE — HISTORY OF PRESENT ILLNESS
[de-identified] : 63 y/o female presenting with chronic neck pain worsening over the past couple of months. The pain radiates into her left scapula. She has been taking diclofenac with relief. Massage and acupuncture help.  denies recent illness, fevers, numbness, weakness, balance problems, saddle anesthesia, urinary retention or fecal incontinence.

## 2024-07-18 ENCOUNTER — APPOINTMENT (OUTPATIENT)
Dept: ORTHOPEDIC SURGERY | Facility: CLINIC | Age: 64
End: 2024-07-18

## 2024-07-18 DIAGNOSIS — M89.8X1 OTHER SPECIFIED DISORDERS OF BONE, SHOULDER: ICD-10-CM

## 2024-07-18 PROCEDURE — 99214 OFFICE O/P EST MOD 30 MIN: CPT

## 2024-12-24 ENCOUNTER — APPOINTMENT (OUTPATIENT)
Dept: ORTHOPEDIC SURGERY | Facility: CLINIC | Age: 64
End: 2024-12-24
Payer: MEDICARE

## 2024-12-24 DIAGNOSIS — M17.10 UNILATERAL PRIMARY OSTEOARTHRITIS, UNSPECIFIED KNEE: ICD-10-CM

## 2024-12-24 PROCEDURE — 20610 DRAIN/INJ JOINT/BURSA W/O US: CPT | Mod: 50

## 2024-12-24 PROCEDURE — 99213 OFFICE O/P EST LOW 20 MIN: CPT | Mod: 25

## 2025-05-08 ENCOUNTER — APPOINTMENT (OUTPATIENT)
Dept: ORTHOPEDIC SURGERY | Facility: CLINIC | Age: 65
End: 2025-05-08
Payer: MEDICARE

## 2025-05-08 DIAGNOSIS — M17.10 UNILATERAL PRIMARY OSTEOARTHRITIS, UNSPECIFIED KNEE: ICD-10-CM

## 2025-05-08 PROCEDURE — 99213 OFFICE O/P EST LOW 20 MIN: CPT | Mod: 25

## 2025-05-08 PROCEDURE — 20610 DRAIN/INJ JOINT/BURSA W/O US: CPT | Mod: 50

## 2025-07-16 ENCOUNTER — APPOINTMENT (OUTPATIENT)
Dept: ORTHOPEDIC SURGERY | Facility: CLINIC | Age: 65
End: 2025-07-16

## 2025-08-11 ENCOUNTER — APPOINTMENT (OUTPATIENT)
Dept: ORTHOPEDIC SURGERY | Facility: CLINIC | Age: 65
End: 2025-08-11
Payer: MEDICARE

## 2025-08-11 DIAGNOSIS — M17.10 UNILATERAL PRIMARY OSTEOARTHRITIS, UNSPECIFIED KNEE: ICD-10-CM

## 2025-08-11 PROCEDURE — 99203 OFFICE O/P NEW LOW 30 MIN: CPT | Mod: 25

## 2025-08-11 PROCEDURE — 20610 DRAIN/INJ JOINT/BURSA W/O US: CPT | Mod: 50

## 2025-08-11 PROCEDURE — 99213 OFFICE O/P EST LOW 20 MIN: CPT | Mod: 25
